# Patient Record
Sex: MALE | Race: WHITE | Employment: STUDENT | URBAN - METROPOLITAN AREA
[De-identification: names, ages, dates, MRNs, and addresses within clinical notes are randomized per-mention and may not be internally consistent; named-entity substitution may affect disease eponyms.]

---
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
## 2022-03-17 ENCOUNTER — TRANSCRIBE ORDER (OUTPATIENT)
Dept: SCHEDULING | Age: 22
End: 2022-03-17

## 2022-03-17 DIAGNOSIS — M54.16 LUMBAR RADICULOPATHY: ICD-10-CM

## 2022-03-17 DIAGNOSIS — M54.50 LUMBAR PAIN: Primary | ICD-10-CM

## 2022-03-17 DIAGNOSIS — M51.26 DISPLACEMENT OF LUMBAR INTERVERTEBRAL DISC WITHOUT MYELOPATHY: ICD-10-CM

## 2022-03-19 PROBLEM — M51.26 HNP (HERNIATED NUCLEUS PULPOSUS), LUMBAR: Status: ACTIVE | Noted: 2017-12-05

## 2022-05-04 ENCOUNTER — TRANSCRIBE ORDER (OUTPATIENT)
Dept: SCHEDULING | Age: 22
End: 2022-05-04

## 2022-05-04 DIAGNOSIS — M47.817 LUMBOSACRAL SPONDYLOSIS WITHOUT MYELOPATHY: ICD-10-CM

## 2022-05-04 DIAGNOSIS — M54.16 LUMBAR RADICULOPATHY: ICD-10-CM

## 2022-05-04 DIAGNOSIS — M54.50 LUMBAR PAIN: Primary | ICD-10-CM

## 2022-05-24 ENCOUNTER — HOSPITAL ENCOUNTER (OUTPATIENT)
Dept: MRI IMAGING | Age: 22
Discharge: HOME | End: 2022-05-24
Attending: ORTHOPAEDIC SURGERY
Payer: COMMERCIAL

## 2022-05-24 DIAGNOSIS — M47.817 LUMBOSACRAL SPONDYLOSIS WITHOUT MYELOPATHY: ICD-10-CM

## 2022-05-24 DIAGNOSIS — M54.16 LUMBAR RADICULOPATHY: ICD-10-CM

## 2022-05-24 DIAGNOSIS — M54.50 LUMBAR PAIN: ICD-10-CM

## 2022-05-24 PROCEDURE — 72148 MRI LUMBAR SPINE W/O DYE: CPT

## 2022-07-21 ENCOUNTER — HOSPITAL ENCOUNTER (OUTPATIENT)
Dept: GENERAL RADIOLOGY | Age: 22
Discharge: HOME | End: 2022-07-21
Attending: ORTHOPAEDIC SURGERY
Payer: COMMERCIAL

## 2022-07-21 ENCOUNTER — HOSPITAL ENCOUNTER (OUTPATIENT)
Dept: PREADMISSION TESTING | Age: 22
Discharge: HOME | End: 2022-07-21
Payer: COMMERCIAL

## 2022-07-21 VITALS
SYSTOLIC BLOOD PRESSURE: 121 MMHG | OXYGEN SATURATION: 97 % | HEART RATE: 76 BPM | BODY MASS INDEX: 33.38 KG/M2 | RESPIRATION RATE: 20 BRPM | TEMPERATURE: 98 F | HEIGHT: 66 IN | DIASTOLIC BLOOD PRESSURE: 77 MMHG | WEIGHT: 207.67 LBS

## 2022-07-21 LAB
25(OH)D3 SERPL-MCNC: 15.6 NG/ML (ref 30–100)
ABO + RH BLD: NORMAL
ALBUMIN SERPL-MCNC: 3.9 G/DL (ref 3.5–5)
ALBUMIN/GLOB SERPL: 1.1 {RATIO} (ref 1.1–2.2)
ALP SERPL-CCNC: 70 U/L (ref 45–117)
ALT SERPL-CCNC: 20 U/L (ref 12–78)
AMPHET UR QL SCN: NEGATIVE
ANION GAP SERPL CALC-SCNC: 7 MMOL/L (ref 5–15)
APPEARANCE UR: CLEAR
AST SERPL-CCNC: 17 U/L (ref 15–37)
BACTERIA URNS QL MICRO: NEGATIVE /HPF
BARBITURATES UR QL SCN: NEGATIVE
BENZODIAZ UR QL: NEGATIVE
BILIRUB SERPL-MCNC: 0.6 MG/DL (ref 0.2–1)
BILIRUB UR QL: NEGATIVE
BLOOD GROUP ANTIBODIES SERPL: NORMAL
BUN SERPL-MCNC: 12 MG/DL (ref 6–20)
BUN/CREAT SERPL: 15 (ref 12–20)
CALCIUM SERPL-MCNC: 9 MG/DL (ref 8.5–10.1)
CANNABINOIDS UR QL SCN: POSITIVE
CHLORIDE SERPL-SCNC: 108 MMOL/L (ref 97–108)
CO2 SERPL-SCNC: 24 MMOL/L (ref 21–32)
COCAINE UR QL SCN: NEGATIVE
COLOR UR: ABNORMAL
CREAT SERPL-MCNC: 0.81 MG/DL (ref 0.7–1.3)
DRUG SCRN COMMENT,DRGCM: ABNORMAL
EPITH CASTS URNS QL MICRO: ABNORMAL /LPF
ERYTHROCYTE [DISTWIDTH] IN BLOOD BY AUTOMATED COUNT: 11.5 % (ref 11.5–14.5)
EST. AVERAGE GLUCOSE BLD GHB EST-MCNC: 94 MG/DL
GLOBULIN SER CALC-MCNC: 3.4 G/DL (ref 2–4)
GLUCOSE SERPL-MCNC: 93 MG/DL (ref 65–100)
GLUCOSE UR STRIP.AUTO-MCNC: NEGATIVE MG/DL
HBA1C MFR BLD: 4.9 % (ref 4–5.6)
HCT VFR BLD AUTO: 39.8 % (ref 36.6–50.3)
HGB BLD-MCNC: 14.3 G/DL (ref 12.1–17)
HGB UR QL STRIP: NEGATIVE
HYALINE CASTS URNS QL MICRO: ABNORMAL /LPF (ref 0–2)
INR PPP: 1 (ref 0.9–1.1)
KETONES UR QL STRIP.AUTO: ABNORMAL MG/DL
LEUKOCYTE ESTERASE UR QL STRIP.AUTO: NEGATIVE
MCH RBC QN AUTO: 30.5 PG (ref 26–34)
MCHC RBC AUTO-ENTMCNC: 35.9 G/DL (ref 30–36.5)
MCV RBC AUTO: 84.9 FL (ref 80–99)
METHADONE UR QL: NEGATIVE
NITRITE UR QL STRIP.AUTO: NEGATIVE
NRBC # BLD: 0 K/UL (ref 0–0.01)
NRBC BLD-RTO: 0 PER 100 WBC
OPIATES UR QL: NEGATIVE
PCP UR QL: NEGATIVE
PH UR STRIP: 6 [PH] (ref 5–8)
PLATELET # BLD AUTO: 289 K/UL (ref 150–400)
PMV BLD AUTO: 10 FL (ref 8.9–12.9)
POTASSIUM SERPL-SCNC: 3.8 MMOL/L (ref 3.5–5.1)
PREALB SERPL-MCNC: 22.7 MG/DL (ref 20–40)
PROT SERPL-MCNC: 7.3 G/DL (ref 6.4–8.2)
PROT UR STRIP-MCNC: NEGATIVE MG/DL
PROTHROMBIN TIME: 10.6 SEC (ref 9–11.1)
RBC # BLD AUTO: 4.69 M/UL (ref 4.1–5.7)
RBC #/AREA URNS HPF: ABNORMAL /HPF (ref 0–5)
SODIUM SERPL-SCNC: 139 MMOL/L (ref 136–145)
SP GR UR REFRACTOMETRY: 1.03
SPECIMEN EXP DATE BLD: NORMAL
UA: UC IF INDICATED,UAUC: ABNORMAL
UROBILINOGEN UR QL STRIP.AUTO: 1 EU/DL (ref 0.2–1)
WBC # BLD AUTO: 8.8 K/UL (ref 4.1–11.1)
WBC URNS QL MICRO: ABNORMAL /HPF (ref 0–4)

## 2022-07-21 PROCEDURE — 80307 DRUG TEST PRSMV CHEM ANLYZR: CPT

## 2022-07-21 PROCEDURE — 86900 BLOOD TYPING SEROLOGIC ABO: CPT

## 2022-07-21 PROCEDURE — 93005 ELECTROCARDIOGRAM TRACING: CPT

## 2022-07-21 PROCEDURE — 84134 ASSAY OF PREALBUMIN: CPT

## 2022-07-21 PROCEDURE — 80053 COMPREHEN METABOLIC PANEL: CPT

## 2022-07-21 PROCEDURE — 97116 GAIT TRAINING THERAPY: CPT

## 2022-07-21 PROCEDURE — 85610 PROTHROMBIN TIME: CPT

## 2022-07-21 PROCEDURE — 82306 VITAMIN D 25 HYDROXY: CPT

## 2022-07-21 PROCEDURE — 36415 COLL VENOUS BLD VENIPUNCTURE: CPT

## 2022-07-21 PROCEDURE — 81001 URINALYSIS AUTO W/SCOPE: CPT

## 2022-07-21 PROCEDURE — 85027 COMPLETE CBC AUTOMATED: CPT

## 2022-07-21 PROCEDURE — 97161 PT EVAL LOW COMPLEX 20 MIN: CPT

## 2022-07-21 PROCEDURE — 71046 X-RAY EXAM CHEST 2 VIEWS: CPT

## 2022-07-21 PROCEDURE — 83036 HEMOGLOBIN GLYCOSYLATED A1C: CPT

## 2022-07-21 RX ORDER — SODIUM CHLORIDE, SODIUM LACTATE, POTASSIUM CHLORIDE, CALCIUM CHLORIDE 600; 310; 30; 20 MG/100ML; MG/100ML; MG/100ML; MG/100ML
25 INJECTION, SOLUTION INTRAVENOUS CONTINUOUS
Status: CANCELLED | OUTPATIENT
Start: 2022-07-26

## 2022-07-21 RX ORDER — ACETAMINOPHEN 500 MG
1000 TABLET ORAL ONCE
Status: CANCELLED | OUTPATIENT
Start: 2022-07-26 | End: 2022-07-26

## 2022-07-21 RX ORDER — PREGABALIN 75 MG/1
75 CAPSULE ORAL ONCE
Status: CANCELLED | OUTPATIENT
Start: 2022-07-26 | End: 2022-07-26

## 2022-07-21 NOTE — PROGRESS NOTES
[facility]  Physical Therapy Pre-surgery evaluation  [address] [facility], [address] [facility]    PHYSICAL THERAPY PRE SPINE SURGERY EVALUATION  Patient:Ameya Priest (17 y.o. male)  Date: 7/21/2022    pat  Procedure(s) (LRB):  LEFT L5-S1 DECOMPRESSION (Left)     Precautions:          ASSESSMENT :  Based on the objective data described below, the patient presents with impaired sensation with constant/variable intensity of pain with radiation into LLE due to end stage degenerative joint disease in the spinal level: lumbar spine. Discussed anticipated disposition to home with possible discharge within a 1 to 2 day time frame post-surgery. Patient and  in agreement. Anticipate patient will need acute PT and OT orders based on expected deficits post surgery. GOALS: (Goals have been discussed and agreed upon with patient.)  DISCHARGE GOALS: Time Frame: 1 DAY  Patient will demonstrate increased strength, range of motion, and pain control via a home exercise program in order to minimize functional deficits in preparation for their upcoming surgery. This will be achieved by using education, demonstration and through the use of an informational handout including a home exercise program.  REHABILITATION POTENTIAL FOR STATED GOALS: Excellent     RECOMMENDATIONS AND PLANNED INTERVENTIONS: (Benefits and precautions of physical therapy have been discussed with the patient.)  Home Exercise Program  TREATMENT PLAN EFFECTIVE DATES: 7/21/2022 to 7/21/2022   FREQUENCY/DURATION: Patient to continue to perform home exercise program at least twice daily until surgery. SUBJECTIVE:   Patient stated  my back pain is worse with standing and walking and decreases with sitting or lying in bed on my side.      OBJECTIVE DATA SUMMARY:   HISTORY:      Past Medical History:   Diagnosis Date    Chronic pain     Lumbar    Nicotine vapor product user      Past Surgical History:   Procedure Laterality Date HX HEENT      Oral Surgery    HX ORTHOPAEDIC  12/05/2017    right L5-S1 discectomy       Prior Level of Function/Home Situation: independent with ADL and mobility skills, but pain limits walking and standing significantly. Personal factors and/or comorbidities impacting plan of care: none      Home Situation  Home Environment: Private residence  # Steps to Enter: 0  Wheelchair Ramp: No  One/Two Story Residence: Other (Comment) (3 story home lives in basement/elevator to upstairs shower)  Lift Chair Available: No  Living Alone: No  Support Systems: Other Family Member(s)  Patient Expects to be Discharged to[de-identified] Home  Current DME Used/Available at Home: None  Tub or Shower Type: Shower           EXAMINATION/PRESENTATION/DECISION MAKING:     ADLs (Current Functional Status): Bathing/Showering:   [x] Independent  [] Requires Assistance from Someone  [] Sponge Bath Only   Ambulation:  [x] Independent  [] Walk Indoors Only  [x] Walk Outdoors - limited distances  [] Use Assistive Device  [] Use Wheelchair Only     Dressing:  [x] [address] from Someone for:  [] Sock/Shoes  [] Pants  [] Everything   Household Activities:  [] Routine house and yard work  [] Light Housework Only  [x] None       Critical Behavior:  Neurologic State: Alert  Orientation Level: Oriented X4  Cognition: Appropriate decision making, Appropriate for age attention/concentration, Appropriate safety awareness, Follows commands  Safety/Judgement: Awareness of environment, Good awareness of safety precautions    Strength:     Strength:  Within functional limits                       Tone & Sensation:   Tone: Normal              Sensation: Impaired (R 3 lateral toes variable numbness; R 5th toe constantly numb)                  Range Of Motion:     AROM: Within functional limits           PROM: Within functional limits                Coordination:   Coordination: Within functional limits    Functional Mobility:  Transfers:  Sit to Stand: Independent  Stand to Sit: Independent        Bed to Chair: Independent              Balance:   Sitting: Intact  Standing: Intact  Ambulation/Gait Training:              Gait Description (WDL): Within defined limits (normal gait 150 feet)                                          Functional Measure:  10 Meter walk test:  (Specify if any supplemental oxygen is used, the type, pre, during and post sats.)    Self-Selected Or Fast-Velocity: Self Selected Velocity  Trial 1 (Time to Walk 10 Meters): 10.18 Seconds  Trial 2 (Time to Walk 10 Meters): 9.69 Seconds  Trial 3 (Time to Walk 10 Meters): 9.89 Seconds  Average : 9.9 Seconds  Score (Meters/Second): 1 Meters/Second             Walking Speed (m/s)  Modifier Scale Age 52-63 Age 61-76 Age 66-77 Age 80-80    Male Female Male Female Male Female Male Female   CH   0% Impaired ? 1.39 ? 1.40 ? 1.36 ? 1.30 ? 1.33 ? 1.27 ? 1.21 ? 1.15   CI   1-19% Impaired 1.11-1.38 1.12-1.39 1.09-1.35 1.04-1.29 1.06-1.32 1.01-1.26 0.96-1.20 0.92-1.14   CJ   20-39% Impaired 0.83-1.10 0.84-1.11 0.82-1.08 0.78-1.03 0.80-1.05 0.76-1.00 0.72-0.95 0.69-0.91   CK   40-59% Impaired 0.56-0.82 0.57-0.83 0.54-0.81 0.52-0.77 0.53-0.79 0.51-0.75 0.48-0.71 0.46-0.68   CL   60-79% Impaired 0.28-0.55 0.28-0.56 0.27-0.53 0.26-0.51 0.27-0.52 0.25-0.50 0.24-0.49 0.23-0.45   CM   80-99% Impaired 0.01-0.28 < 0.01-0.28 < 0.01-0.27 < 0.01-0.26 0.01-0.27 0.01-0.24 0.01-0.23 0.01-0.22   CN   100% Impaired Cannot Perform   Minimal Detectable Change (MDC-90) = 0.1 m/s  Vivien ABURTO \"Comfortable and maximum walking speed of adults aged 20-79 years: reference values and determinants. \" Age and Agin Volume 26(1):15-9. Paulette Lucio. \"Age- and gender-related test performance in community-dwelling elderly people: Six-Minute Walk Test, Zafar Balance Scale, Timed Up & Go Test, and gait speeds. \" Physical Therapy: 2002 Volume 82(2):128-37.   Isabell VILLANUEVA, Josias GIANG, Aníbal Millard D. \"Assessing stability and change of four performance measures: a longitudinal study evaluating outcome following total hip and knee arthroplasty. \" [facility] Musculoskeletal Disorders: 2005 Volume 6(3). Endy Coker, PhD; Norma Jerez, PhD. Gwen Bradley Paper: \"Walking Speed: the Sixth Vital Sign\" Journal of Geriatric Physical Therapy: 2009 - Volume 32 - Issue 2 - p 2-5 . Pain:  Pain Scale 1: Numeric (0 - 10)  Pain Intensity 1: 0                Radicular pain:   Lower back to LLE down posterior thigh to just behind knee. Activity Tolerance:   Fair due to lower back pain. Patient []   does  [x]   does not demonstrate signs/symptoms of shortness of breath/dyspnea on exertion/respiratory distress. COMMUNICATION/EDUCATION:   The patient was educated on:  [x]         Early post operative mobility is imperative to achieve a patient's desired outcomes and to restore biological function. [x]         Post operative spinal precautions may/may not be applicable. These precautions are based on the patient's physician and the procedure(s) performed. [x]         Spinal precautions including:    No bending forward, sideways, or backwards    No twisting     No lifting more than 5-10 pounds    No sitting longer than 30-60 minutes at a time    Mauricio brace when out of bed and mobilizing    The patients plan of care was discussed as follows:   [x]         The patient verbalized understanding of his/her plan in preparation for their upcoming surgery  []         The patient's  was present for this session  [x]        The patient reports that he/she does not have a  identified at this time  []         The  verbalized understanding of the education regarding the patient's upcoming surgery  [x]         Patient/family agree to work toward stated goals and plan of care. []         Patient understands intent and goals of therapy, but is neutral about his/her participation.   []         Patient is unable to participate in goal setting and plan of care.       Thank you for this referral.  Silvestre Maldonado, PT    Time Calculation: 13 mins

## 2022-07-21 NOTE — PERIOP NOTES
[facility]  Joint/Spine Preoperative Instructions        Surgery Date 07/26/22     Time of Arrival --to be called with time  Contact # [phone]  1. On the day of your surgery, please report to the Surgical Services Registration Desk and sign in at your designated time. The Surgery Center is located to the right of the Emergency Room. 2. You must have someone with you to drive you home. You should not drive a car for 24 hours following surgery. Please make arrangements for a friend or family member to stay with you for the first 24 hours after your surgery. 3. No food after midnight     0725/22  medications morning of surgery should be taken with a sip of water. Please follow pre-surgery drink instructions that were given at your Pre Admission Testing appointment. 4. We recommend you do not drink any alcoholic beverages for 24 hours before and after your surgery. 5. Contact your surgeons office for instructions on the following medications: non-steroidal anti-inflammatory drugs (i.e. Advil, Aleve), vitamins, and supplements. (Some surgeons will want you to stop these medications prior to surgery and others may allow you to take them)  **If you are currently taking Plavix, Coumadin, Aspirin and/or other blood-thinning agents, contact your surgeon for instructions. ** Your surgeon will partner with the physician prescribing these medications to determine if it is safe to stop or if you need to continue taking. Please do not stop taking these medications without instructions from your surgeon    6. Wear comfortable clothes. Wear glasses instead of contacts. Do not bring any money or jewelry. Please bring picture ID, insurance card, and any prearranged co-payment or hospital payment. Do not wear make-up, particularly mascara the morning of your surgery. Do not wear nail polish, particularly if you are having foot /hand surgery.   Wear your hair loose or down, no ponytails, buns, parish pins or clips. All body piercings must be removed. Please shower with antibacterial soap for three consecutive days before and on the morning of surgery, but do not apply any lotions, powders or deodorants after the shower on the day of surgery. Please use a fresh towels after each shower. Please sleep in clean clothes and change bed linens the night before surgery. Please do not shave for 48 hours prior to surgery. Shaving of the face is acceptable. 7. You should understand that if you do not follow these instructions your surgery may be cancelled. If your physical condition changes (I.e. fever, cold or flu) please contact your surgeon as soon as possible. 8. It is important that you be on time. If a situation occurs where you may be late, please call [phone] (OR Holding Area). 9. If you have any questions and or problems, please call [phone] (Pre-admission Testing). 10. Your surgery time may be subject to change. You will receive a phone call the evening prior if your time changes. 11.  If having outpatient surgery, you must have someone to drive you here, stay with you during the duration of your stay, and to drive you home at time of discharge. 12. The following link is for the educational video for patients and/or families. [email or web address]-sharma.[email or web address]-qkhohhj-mwpxavf/Mobile/[facility]/[email or web address]-materials    1  Special Instructions:       TAKE ALL MEDICATIONS THE DAY OF SURGERY EXCEPT:no  exception      I understand a pre-operative phone call will be made to verify my surgery time. In the event that I am not available, I give permission for a message to be left on my answering service and/or with another person?   yes         ___________________      __________   _________    (Signature of Patient)             (Witness)                (Date and Time)

## 2022-07-21 NOTE — PERIOP NOTES
[facility]  \"We've Got Your Back! Caring For Yourself Before and After Spine Surgery\" handbook was provided & reviewed with the patient during the pre-admission testing (PAT) appointment. An opportunity for questions was provided, patient verbalized understanding.

## 2022-07-21 NOTE — PERIOP NOTES

## 2022-07-21 NOTE — PERIOP NOTES
Hibiclens/Chlorhexidine    Preventing Infections Before and After - Your Surgery    IMPORTANT INSTRUCTIONS    Please read and follow these instructions carefully. If you are unable to comply with the below instructions your procedure will be cancelled. Every Night for Three (3) nights before your surgery:  Shower with an antibacterial soap, such as Dial, or the soap provided at your preassessment appointment. A shower is better than a bath for cleaning your skin. If needed, ask someone to help you reach all areas of your body. Dont forget to clean your belly button with every shower. The night before your surgery: If you lose your Hibiclens/chlorhexidine please contact surgery center or you can purchase it at a local pharmacy  On the night before your surgery, shower with an antibacterial soap, such as Dial, or the soap provided at your preassessment appointment. With one packet of Hibiclens/Chlorhexidine in hand, turn water off. Apply Hibiclens antiseptic skin cleanser with a clean, freshly washed washcloth. Gently apply to your body from chin to toes (except the genital area) and especially the area(s) where your incision(s) will be. Leave Hibiclens/Chlorhexidine on your skin for at least 20 seconds. CAUTION: If needed, Hibiclens/chlorhexidine may be used to clean the folds of skin of the legs (such as in the area of the groin) and on your buttocks and hips. However, do not use Hibiclens/Chlorhexidine above the neck or in the genital area (your bottom) or put inside any area of your body. Turn the water back on and rinse. Dry gently with a clean, freshly washed towel. After your shower, do not use any powder, deodorant, perfumes or lotion. Use clean, freshly washed towels and washcloths every time you shower. Wear clean, freshly washed pajamas to bed the night before surgery. Sleep on clean, freshly washed sheets. Do not allow pets to sleep in your bed with you.         The Morning of your surgery:  Shower again thoroughly with an antibacterial soap, such as Dial or the soap provided at your preassessment appointment. If needed, ask someone for help to reach all areas of your body. Dont forget to clean your belly button! Rinse. Dry gently with a clean, freshly washed towel. After your shower, do not use any powder, deodorant, perfumes or lotion prior to surgery. Put on clean, freshly washed clothing. Tips to help prevent infections after your surgery:  Protect your surgical wound from germs:  Hand washing is the most important thing you and your caregivers can do to prevent infections. Keep your bandage clean and dry! Do not touch your surgical wound. Use clean, freshly washed towels and washcloths every time you shower; do not share bath linens with others. Until your surgical wound is healed, wear clothing and sleep on bed linens each day that are clean and freshly washed. Do not allow pets to sleep in your bed with you or touch your surgical wound. Do not smoke - smoking delays wound healing. This may be a good time to stop smoking. If you have diabetes, it is important for you to manage your blood sugar levels properly before your surgery as well as after your surgery. Poorly managed blood sugar levels slow down wound healing and prevent you from healing completely. If you lose your Hibiclens/chlorhexidine, please call the [facility], or it is available for purchase at your pharmacy.                ___________________      ___________________      ________________  (Signature of Patient)          (Witness)                   (Date and Time)

## 2022-07-21 NOTE — PERIOP NOTES

## 2022-07-22 LAB
ATRIAL RATE: 60 BPM
BACTERIA SPEC CULT: NORMAL
BACTERIA SPEC CULT: NORMAL
CALCULATED P AXIS, ECG09: -13 DEGREES
CALCULATED R AXIS, ECG10: 17 DEGREES
CALCULATED T AXIS, ECG11: 31 DEGREES
DIAGNOSIS, 93000: NORMAL
P-R INTERVAL, ECG05: 154 MS
Q-T INTERVAL, ECG07: 360 MS
QRS DURATION, ECG06: 96 MS
QTC CALCULATION (BEZET), ECG08: 360 MS
SERVICE CMNT-IMP: NORMAL
VENTRICULAR RATE, ECG03: 60 BPM

## 2022-07-25 NOTE — PERIOP NOTES
Message left for patient to start Vitamin 2000 units daily on Friday, 7/22/22. I called this am and verified patient received the message. Patient states he started the Vitamin D over the weekend.

## 2022-07-26 ENCOUNTER — APPOINTMENT (OUTPATIENT)
Dept: GENERAL RADIOLOGY | Age: 22
End: 2022-07-26
Attending: ORTHOPAEDIC SURGERY
Payer: COMMERCIAL

## 2022-07-26 ENCOUNTER — ANESTHESIA (OUTPATIENT)
Dept: SURGERY | Age: 22
End: 2022-07-26
Payer: COMMERCIAL

## 2022-07-26 ENCOUNTER — ANESTHESIA EVENT (OUTPATIENT)
Dept: SURGERY | Age: 22
End: 2022-07-26
Payer: COMMERCIAL

## 2022-07-26 ENCOUNTER — HOSPITAL ENCOUNTER (OUTPATIENT)
Age: 22
Discharge: HOME | End: 2022-07-26
Attending: ORTHOPAEDIC SURGERY | Admitting: ORTHOPAEDIC SURGERY
Payer: COMMERCIAL

## 2022-07-26 VITALS
DIASTOLIC BLOOD PRESSURE: 80 MMHG | WEIGHT: 208.11 LBS | RESPIRATION RATE: 18 BRPM | BODY MASS INDEX: 33.45 KG/M2 | HEART RATE: 88 BPM | TEMPERATURE: 98 F | SYSTOLIC BLOOD PRESSURE: 133 MMHG | OXYGEN SATURATION: 97 % | HEIGHT: 66 IN

## 2022-07-26 DIAGNOSIS — Z98.890 STATUS POST LUMBAR SPINE OPERATIVE PROCEDURE FOR DECOMPRESSION OF SPINAL CORD: Primary | ICD-10-CM

## 2022-07-26 DIAGNOSIS — M51.26 HNP (HERNIATED NUCLEUS PULPOSUS), LUMBAR: ICD-10-CM

## 2022-07-26 PROCEDURE — 77030018723 HC ELCTRD BLD COVD -A: Performed by: ORTHOPAEDIC SURGERY

## 2022-07-26 PROCEDURE — 74011250636 HC RX REV CODE- 250/636: Performed by: ANESTHESIOLOGY

## 2022-07-26 PROCEDURE — 77030038692 HC WND DEB SYS IRMX -B: Performed by: ORTHOPAEDIC SURGERY

## 2022-07-26 PROCEDURE — 76000 FLUOROSCOPY <1 HR PHYS/QHP: CPT

## 2022-07-26 PROCEDURE — 97116 GAIT TRAINING THERAPY: CPT | Performed by: PHYSICAL THERAPIST

## 2022-07-26 PROCEDURE — 77030040922 HC BLNKT HYPOTHRM STRY -A

## 2022-07-26 PROCEDURE — 97530 THERAPEUTIC ACTIVITIES: CPT | Performed by: PHYSICAL THERAPIST

## 2022-07-26 PROCEDURE — 77030019908 HC STETH ESOPH SIMS -A: Performed by: NURSE ANESTHETIST, CERTIFIED REGISTERED

## 2022-07-26 PROCEDURE — 74011250636 HC RX REV CODE- 250/636: Performed by: ORTHOPAEDIC SURGERY

## 2022-07-26 PROCEDURE — 77030004402 HC BUR NEUR STRY -C: Performed by: ORTHOPAEDIC SURGERY

## 2022-07-26 PROCEDURE — 77030033138 HC SUT PGA STRATFX J&J -B: Performed by: ORTHOPAEDIC SURGERY

## 2022-07-26 PROCEDURE — 77030003666 HC NDL SPINAL BD -A: Performed by: ORTHOPAEDIC SURGERY

## 2022-07-26 PROCEDURE — G0378 HOSPITAL OBSERVATION PER HR: HCPCS

## 2022-07-26 PROCEDURE — 74011250636 HC RX REV CODE- 250/636: Performed by: NURSE ANESTHETIST, CERTIFIED REGISTERED

## 2022-07-26 PROCEDURE — 76210000002 HC OR PH I REC 3 TO 3.5 HR: Performed by: ORTHOPAEDIC SURGERY

## 2022-07-26 PROCEDURE — L0627 LO SAG RI AN/POS PNL PRE CST: HCPCS

## 2022-07-26 PROCEDURE — 77030003029 HC SUT VCRL J&J -B: Performed by: ORTHOPAEDIC SURGERY

## 2022-07-26 PROCEDURE — 74011000250 HC RX REV CODE- 250: Performed by: NURSE ANESTHETIST, CERTIFIED REGISTERED

## 2022-07-26 PROCEDURE — 97165 OT EVAL LOW COMPLEX 30 MIN: CPT | Performed by: OCCUPATIONAL THERAPIST

## 2022-07-26 PROCEDURE — 2709999900 HC NON-CHARGEABLE SUPPLY: Performed by: ORTHOPAEDIC SURGERY

## 2022-07-26 PROCEDURE — 74011000250 HC RX REV CODE- 250: Performed by: ORTHOPAEDIC SURGERY

## 2022-07-26 PROCEDURE — 2709999900 HC NON-CHARGEABLE SUPPLY

## 2022-07-26 PROCEDURE — 72100 X-RAY EXAM L-S SPINE 2/3 VWS: CPT

## 2022-07-26 PROCEDURE — 77030003028 HC SUT VCRL J&J -A: Performed by: ORTHOPAEDIC SURGERY

## 2022-07-26 PROCEDURE — 97535 SELF CARE MNGMENT TRAINING: CPT | Performed by: OCCUPATIONAL THERAPIST

## 2022-07-26 PROCEDURE — 77030008684 HC TU ET CUF COVD -B: Performed by: NURSE ANESTHETIST, CERTIFIED REGISTERED

## 2022-07-26 PROCEDURE — 74011250637 HC RX REV CODE- 250/637: Performed by: ORTHOPAEDIC SURGERY

## 2022-07-26 PROCEDURE — 77030014647 HC SEAL FBRN TISSL BAXT -D: Performed by: ORTHOPAEDIC SURGERY

## 2022-07-26 PROCEDURE — 76060000034 HC ANESTHESIA 1.5 TO 2 HR: Performed by: ORTHOPAEDIC SURGERY

## 2022-07-26 PROCEDURE — 97530 THERAPEUTIC ACTIVITIES: CPT | Performed by: OCCUPATIONAL THERAPIST

## 2022-07-26 PROCEDURE — 77030026438 HC STYL ET INTUB CARD -A: Performed by: NURSE ANESTHETIST, CERTIFIED REGISTERED

## 2022-07-26 PROCEDURE — 97161 PT EVAL LOW COMPLEX 20 MIN: CPT | Performed by: PHYSICAL THERAPIST

## 2022-07-26 PROCEDURE — 77030035236 HC SUT PDS STRATFX BARB J&J -B: Performed by: ORTHOPAEDIC SURGERY

## 2022-07-26 PROCEDURE — 76010000162 HC OR TIME 1.5 TO 2 HR INTENSV-TIER 1: Performed by: ORTHOPAEDIC SURGERY

## 2022-07-26 RX ORDER — CYCLOBENZAPRINE HCL 10 MG
10 TABLET ORAL
Status: DISCONTINUED | OUTPATIENT
Start: 2022-07-26 | End: 2022-07-26 | Stop reason: HOSPADM

## 2022-07-26 RX ORDER — PROPOFOL 10 MG/ML
INJECTION, EMULSION INTRAVENOUS AS NEEDED
Status: DISCONTINUED | OUTPATIENT
Start: 2022-07-26 | End: 2022-07-26 | Stop reason: HOSPADM

## 2022-07-26 RX ORDER — NEOSTIGMINE METHYLSULFATE 1 MG/ML
INJECTION, SOLUTION INTRAVENOUS AS NEEDED
Status: DISCONTINUED | OUTPATIENT
Start: 2022-07-26 | End: 2022-07-26 | Stop reason: HOSPADM

## 2022-07-26 RX ORDER — OXYCODONE HYDROCHLORIDE 5 MG/1
5 TABLET ORAL
Qty: 28 TABLET | Refills: 0 | Status: SHIPPED | OUTPATIENT
Start: 2022-07-26 | End: 2022-08-02

## 2022-07-26 RX ORDER — ACETAMINOPHEN 500 MG
1000 TABLET ORAL EVERY 6 HOURS
Qty: 56 TABLET | Refills: 0 | Status: SHIPPED | OUTPATIENT
Start: 2022-07-26 | End: 2022-08-02

## 2022-07-26 RX ORDER — POLYETHYLENE GLYCOL 3350 17 G/17G
17 POWDER, FOR SOLUTION ORAL DAILY
Status: DISCONTINUED | OUTPATIENT
Start: 2022-07-27 | End: 2022-07-26 | Stop reason: HOSPADM

## 2022-07-26 RX ORDER — SODIUM CHLORIDE, SODIUM LACTATE, POTASSIUM CHLORIDE, CALCIUM CHLORIDE 600; 310; 30; 20 MG/100ML; MG/100ML; MG/100ML; MG/100ML
75 INJECTION, SOLUTION INTRAVENOUS CONTINUOUS
Status: DISCONTINUED | OUTPATIENT
Start: 2022-07-26 | End: 2022-07-26 | Stop reason: HOSPADM

## 2022-07-26 RX ORDER — SODIUM CHLORIDE 9 MG/ML
50 INJECTION, SOLUTION INTRAVENOUS CONTINUOUS
Status: DISCONTINUED | OUTPATIENT
Start: 2022-07-26 | End: 2022-07-26 | Stop reason: HOSPADM

## 2022-07-26 RX ORDER — FENTANYL CITRATE 50 UG/ML
INJECTION, SOLUTION INTRAMUSCULAR; INTRAVENOUS AS NEEDED
Status: DISCONTINUED | OUTPATIENT
Start: 2022-07-26 | End: 2022-07-26 | Stop reason: HOSPADM

## 2022-07-26 RX ORDER — OXYCODONE HYDROCHLORIDE 5 MG/1
10-15 TABLET ORAL
Status: DISCONTINUED | OUTPATIENT
Start: 2022-07-26 | End: 2022-07-26 | Stop reason: HOSPADM

## 2022-07-26 RX ORDER — ONDANSETRON 2 MG/ML
4 INJECTION INTRAMUSCULAR; INTRAVENOUS AS NEEDED
Status: DISCONTINUED | OUTPATIENT
Start: 2022-07-26 | End: 2022-07-26 | Stop reason: HOSPADM

## 2022-07-26 RX ORDER — SODIUM CHLORIDE 0.9 % (FLUSH) 0.9 %
5-40 SYRINGE (ML) INJECTION AS NEEDED
Status: DISCONTINUED | OUTPATIENT
Start: 2022-07-26 | End: 2022-07-26 | Stop reason: HOSPADM

## 2022-07-26 RX ORDER — FENTANYL CITRATE 50 UG/ML
25 INJECTION, SOLUTION INTRAMUSCULAR; INTRAVENOUS
Status: DISCONTINUED | OUTPATIENT
Start: 2022-07-26 | End: 2022-07-26 | Stop reason: HOSPADM

## 2022-07-26 RX ORDER — AMOXICILLIN 250 MG
1 CAPSULE ORAL 2 TIMES DAILY
Qty: 14 TABLET | Refills: 0 | Status: SHIPPED | OUTPATIENT
Start: 2022-07-26 | End: 2022-08-02

## 2022-07-26 RX ORDER — SUCCINYLCHOLINE CHLORIDE 20 MG/ML
INJECTION INTRAMUSCULAR; INTRAVENOUS AS NEEDED
Status: DISCONTINUED | OUTPATIENT
Start: 2022-07-26 | End: 2022-07-26 | Stop reason: HOSPADM

## 2022-07-26 RX ORDER — AMOXICILLIN 250 MG
1 CAPSULE ORAL 2 TIMES DAILY
Status: DISCONTINUED | OUTPATIENT
Start: 2022-07-26 | End: 2022-07-26 | Stop reason: HOSPADM

## 2022-07-26 RX ORDER — FAMOTIDINE 20 MG/1
20 TABLET, FILM COATED ORAL 2 TIMES DAILY
Status: DISCONTINUED | OUTPATIENT
Start: 2022-07-26 | End: 2022-07-26 | Stop reason: HOSPADM

## 2022-07-26 RX ORDER — ACETAMINOPHEN 500 MG
1000 TABLET ORAL ONCE
Status: COMPLETED | OUTPATIENT
Start: 2022-07-26 | End: 2022-07-26

## 2022-07-26 RX ORDER — KETOROLAC TROMETHAMINE 30 MG/ML
15 INJECTION, SOLUTION INTRAMUSCULAR; INTRAVENOUS EVERY 6 HOURS
Status: DISCONTINUED | OUTPATIENT
Start: 2022-07-26 | End: 2022-07-26 | Stop reason: HOSPADM

## 2022-07-26 RX ORDER — GLYCOPYRROLATE 0.2 MG/ML
INJECTION INTRAMUSCULAR; INTRAVENOUS AS NEEDED
Status: DISCONTINUED | OUTPATIENT
Start: 2022-07-26 | End: 2022-07-26 | Stop reason: HOSPADM

## 2022-07-26 RX ORDER — DEXMEDETOMIDINE HYDROCHLORIDE 100 UG/ML
INJECTION, SOLUTION INTRAVENOUS AS NEEDED
Status: DISCONTINUED | OUTPATIENT
Start: 2022-07-26 | End: 2022-07-26 | Stop reason: HOSPADM

## 2022-07-26 RX ORDER — ACETAMINOPHEN 500 MG
1000 TABLET ORAL EVERY 6 HOURS
Status: DISCONTINUED | OUTPATIENT
Start: 2022-07-26 | End: 2022-07-26 | Stop reason: HOSPADM

## 2022-07-26 RX ORDER — FENTANYL CITRATE 50 UG/ML
50 INJECTION, SOLUTION INTRAMUSCULAR; INTRAVENOUS AS NEEDED
Status: DISCONTINUED | OUTPATIENT
Start: 2022-07-26 | End: 2022-07-26 | Stop reason: HOSPADM

## 2022-07-26 RX ORDER — MIDAZOLAM HYDROCHLORIDE 1 MG/ML
1 INJECTION, SOLUTION INTRAMUSCULAR; INTRAVENOUS AS NEEDED
Status: DISCONTINUED | OUTPATIENT
Start: 2022-07-26 | End: 2022-07-26 | Stop reason: HOSPADM

## 2022-07-26 RX ORDER — LIDOCAINE HYDROCHLORIDE 20 MG/ML
INJECTION, SOLUTION EPIDURAL; INFILTRATION; INTRACAUDAL; PERINEURAL AS NEEDED
Status: DISCONTINUED | OUTPATIENT
Start: 2022-07-26 | End: 2022-07-26 | Stop reason: HOSPADM

## 2022-07-26 RX ORDER — HYDROMORPHONE HYDROCHLORIDE 1 MG/ML
0.2 INJECTION, SOLUTION INTRAMUSCULAR; INTRAVENOUS; SUBCUTANEOUS
Status: DISCONTINUED | OUTPATIENT
Start: 2022-07-26 | End: 2022-07-26 | Stop reason: HOSPADM

## 2022-07-26 RX ORDER — ONDANSETRON 2 MG/ML
4 INJECTION INTRAMUSCULAR; INTRAVENOUS
Status: DISCONTINUED | OUTPATIENT
Start: 2022-07-26 | End: 2022-07-26 | Stop reason: HOSPADM

## 2022-07-26 RX ORDER — HYDROMORPHONE HYDROCHLORIDE 1 MG/ML
1 INJECTION, SOLUTION INTRAMUSCULAR; INTRAVENOUS; SUBCUTANEOUS
Status: DISCONTINUED | OUTPATIENT
Start: 2022-07-26 | End: 2022-07-26 | Stop reason: HOSPADM

## 2022-07-26 RX ORDER — DIPHENHYDRAMINE HYDROCHLORIDE 50 MG/ML
12.5 INJECTION, SOLUTION INTRAMUSCULAR; INTRAVENOUS AS NEEDED
Status: DISCONTINUED | OUTPATIENT
Start: 2022-07-26 | End: 2022-07-26 | Stop reason: HOSPADM

## 2022-07-26 RX ORDER — GABAPENTIN 100 MG/1
100 CAPSULE ORAL 3 TIMES DAILY
Qty: 90 CAPSULE | Refills: 0 | Status: SHIPPED | OUTPATIENT
Start: 2022-07-26 | End: 2022-08-25

## 2022-07-26 RX ORDER — FACIAL-BODY WIPES
10 EACH TOPICAL DAILY PRN
Status: DISCONTINUED | OUTPATIENT
Start: 2022-07-28 | End: 2022-07-26 | Stop reason: HOSPADM

## 2022-07-26 RX ORDER — LIDOCAINE HYDROCHLORIDE 10 MG/ML
0.1 INJECTION, SOLUTION EPIDURAL; INFILTRATION; INTRACAUDAL; PERINEURAL AS NEEDED
Status: DISCONTINUED | OUTPATIENT
Start: 2022-07-26 | End: 2022-07-26 | Stop reason: HOSPADM

## 2022-07-26 RX ORDER — ROCURONIUM BROMIDE 10 MG/ML
INJECTION, SOLUTION INTRAVENOUS AS NEEDED
Status: DISCONTINUED | OUTPATIENT
Start: 2022-07-26 | End: 2022-07-26 | Stop reason: HOSPADM

## 2022-07-26 RX ORDER — ONDANSETRON 2 MG/ML
INJECTION INTRAMUSCULAR; INTRAVENOUS AS NEEDED
Status: DISCONTINUED | OUTPATIENT
Start: 2022-07-26 | End: 2022-07-26 | Stop reason: HOSPADM

## 2022-07-26 RX ORDER — MIDAZOLAM HYDROCHLORIDE 1 MG/ML
0.5 INJECTION, SOLUTION INTRAMUSCULAR; INTRAVENOUS
Status: DISCONTINUED | OUTPATIENT
Start: 2022-07-26 | End: 2022-07-26 | Stop reason: HOSPADM

## 2022-07-26 RX ORDER — MORPHINE SULFATE 2 MG/ML
2 INJECTION, SOLUTION INTRAMUSCULAR; INTRAVENOUS
Status: DISCONTINUED | OUTPATIENT
Start: 2022-07-26 | End: 2022-07-26 | Stop reason: HOSPADM

## 2022-07-26 RX ORDER — SODIUM CHLORIDE 0.9 % (FLUSH) 0.9 %
5-40 SYRINGE (ML) INJECTION EVERY 8 HOURS
Status: DISCONTINUED | OUTPATIENT
Start: 2022-07-26 | End: 2022-07-26 | Stop reason: HOSPADM

## 2022-07-26 RX ORDER — MELATONIN
2000 DAILY
Status: DISCONTINUED | OUTPATIENT
Start: 2022-07-27 | End: 2022-07-26 | Stop reason: HOSPADM

## 2022-07-26 RX ORDER — PROCHLORPERAZINE EDISYLATE 5 MG/ML
5 INJECTION INTRAMUSCULAR; INTRAVENOUS
Status: DISCONTINUED | OUTPATIENT
Start: 2022-07-26 | End: 2022-07-26 | Stop reason: HOSPADM

## 2022-07-26 RX ORDER — OXYCODONE AND ACETAMINOPHEN 5; 325 MG/1; MG/1
1 TABLET ORAL AS NEEDED
Status: DISCONTINUED | OUTPATIENT
Start: 2022-07-26 | End: 2022-07-26 | Stop reason: HOSPADM

## 2022-07-26 RX ORDER — DEXAMETHASONE SODIUM PHOSPHATE 4 MG/ML
INJECTION, SOLUTION INTRA-ARTICULAR; INTRALESIONAL; INTRAMUSCULAR; INTRAVENOUS; SOFT TISSUE AS NEEDED
Status: DISCONTINUED | OUTPATIENT
Start: 2022-07-26 | End: 2022-07-26 | Stop reason: HOSPADM

## 2022-07-26 RX ORDER — GABAPENTIN 100 MG/1
100 CAPSULE ORAL 3 TIMES DAILY
Status: DISCONTINUED | OUTPATIENT
Start: 2022-07-26 | End: 2022-07-26 | Stop reason: HOSPADM

## 2022-07-26 RX ORDER — HYDROMORPHONE HYDROCHLORIDE 2 MG/ML
INJECTION, SOLUTION INTRAMUSCULAR; INTRAVENOUS; SUBCUTANEOUS AS NEEDED
Status: DISCONTINUED | OUTPATIENT
Start: 2022-07-26 | End: 2022-07-26 | Stop reason: HOSPADM

## 2022-07-26 RX ORDER — PREGABALIN 75 MG/1
75 CAPSULE ORAL ONCE
Status: COMPLETED | OUTPATIENT
Start: 2022-07-26 | End: 2022-07-26

## 2022-07-26 RX ORDER — NALOXONE HYDROCHLORIDE 0.4 MG/ML
0.4 INJECTION, SOLUTION INTRAMUSCULAR; INTRAVENOUS; SUBCUTANEOUS AS NEEDED
Status: DISCONTINUED | OUTPATIENT
Start: 2022-07-26 | End: 2022-07-26 | Stop reason: HOSPADM

## 2022-07-26 RX ORDER — SODIUM CHLORIDE, SODIUM LACTATE, POTASSIUM CHLORIDE, CALCIUM CHLORIDE 600; 310; 30; 20 MG/100ML; MG/100ML; MG/100ML; MG/100ML
25 INJECTION, SOLUTION INTRAVENOUS CONTINUOUS
Status: DISCONTINUED | OUTPATIENT
Start: 2022-07-26 | End: 2022-07-26 | Stop reason: HOSPADM

## 2022-07-26 RX ORDER — HYDROXYZINE HYDROCHLORIDE 10 MG/1
10 TABLET, FILM COATED ORAL
Status: DISCONTINUED | OUTPATIENT
Start: 2022-07-26 | End: 2022-07-26 | Stop reason: HOSPADM

## 2022-07-26 RX ORDER — OXYCODONE HYDROCHLORIDE 5 MG/1
5 TABLET ORAL
Status: DISCONTINUED | OUTPATIENT
Start: 2022-07-26 | End: 2022-07-26 | Stop reason: HOSPADM

## 2022-07-26 RX ORDER — SODIUM CHLORIDE 9 MG/ML
125 INJECTION, SOLUTION INTRAVENOUS CONTINUOUS
Status: DISCONTINUED | OUTPATIENT
Start: 2022-07-26 | End: 2022-07-26 | Stop reason: HOSPADM

## 2022-07-26 RX ORDER — ROPIVACAINE HYDROCHLORIDE 5 MG/ML
INJECTION, SOLUTION EPIDURAL; INFILTRATION; PERINEURAL AS NEEDED
Status: DISCONTINUED | OUTPATIENT
Start: 2022-07-26 | End: 2022-07-26 | Stop reason: HOSPADM

## 2022-07-26 RX ORDER — MIDAZOLAM HYDROCHLORIDE 1 MG/ML
INJECTION, SOLUTION INTRAMUSCULAR; INTRAVENOUS AS NEEDED
Status: DISCONTINUED | OUTPATIENT
Start: 2022-07-26 | End: 2022-07-26 | Stop reason: HOSPADM

## 2022-07-26 RX ORDER — DIAZEPAM 5 MG/1
5 TABLET ORAL
Status: DISCONTINUED | OUTPATIENT
Start: 2022-07-26 | End: 2022-07-26 | Stop reason: HOSPADM

## 2022-07-26 RX ADMIN — ROCURONIUM BROMIDE 45 MG: 10 INJECTION INTRAVENOUS at 09:06

## 2022-07-26 RX ADMIN — Medication 3 MG: at 10:04

## 2022-07-26 RX ADMIN — LIDOCAINE HYDROCHLORIDE 80 MG: 20 INJECTION, SOLUTION EPIDURAL; INFILTRATION; INTRACAUDAL; PERINEURAL at 08:57

## 2022-07-26 RX ADMIN — HYDROMORPHONE HYDROCHLORIDE 0.5 MG: 2 INJECTION, SOLUTION INTRAMUSCULAR; INTRAVENOUS; SUBCUTANEOUS at 09:13

## 2022-07-26 RX ADMIN — Medication 3 AMPULE: at 07:13

## 2022-07-26 RX ADMIN — HYDROMORPHONE HYDROCHLORIDE 0.5 MG: 2 INJECTION, SOLUTION INTRAMUSCULAR; INTRAVENOUS; SUBCUTANEOUS at 09:25

## 2022-07-26 RX ADMIN — ROCURONIUM BROMIDE 5 MG: 10 INJECTION INTRAVENOUS at 08:57

## 2022-07-26 RX ADMIN — WATER 2 G: 1 INJECTION INTRAMUSCULAR; INTRAVENOUS; SUBCUTANEOUS at 08:57

## 2022-07-26 RX ADMIN — SODIUM CHLORIDE 125 ML/HR: 9 INJECTION, SOLUTION INTRAVENOUS at 10:40

## 2022-07-26 RX ADMIN — MIDAZOLAM HYDROCHLORIDE 2 MG: 1 INJECTION, SOLUTION INTRAMUSCULAR; INTRAVENOUS at 08:54

## 2022-07-26 RX ADMIN — OXYCODONE 5 MG: 5 TABLET ORAL at 13:58

## 2022-07-26 RX ADMIN — DIAZEPAM 5 MG: 5 TABLET ORAL at 11:33

## 2022-07-26 RX ADMIN — DEXMEDETOMIDINE HYDROCHLORIDE 10 MCG: 100 INJECTION, SOLUTION, CONCENTRATE INTRAVENOUS at 09:03

## 2022-07-26 RX ADMIN — SODIUM CHLORIDE, POTASSIUM CHLORIDE, SODIUM LACTATE AND CALCIUM CHLORIDE 25 ML/HR: 600; 310; 30; 20 INJECTION, SOLUTION INTRAVENOUS at 07:19

## 2022-07-26 RX ADMIN — GLYCOPYRROLATE 0.6 MG: 0.2 INJECTION, SOLUTION INTRAMUSCULAR; INTRAVENOUS at 10:04

## 2022-07-26 RX ADMIN — GABAPENTIN 100 MG: 100 CAPSULE ORAL at 16:27

## 2022-07-26 RX ADMIN — ONDANSETRON HYDROCHLORIDE 4 MG: 2 INJECTION, SOLUTION INTRAMUSCULAR; INTRAVENOUS at 09:52

## 2022-07-26 RX ADMIN — Medication 1000 MG: at 07:12

## 2022-07-26 RX ADMIN — SUCCINYLCHOLINE CHLORIDE 180 MG: 20 INJECTION, SOLUTION INTRAMUSCULAR; INTRAVENOUS at 08:58

## 2022-07-26 RX ADMIN — PREGABALIN 75 MG: 75 CAPSULE ORAL at 07:12

## 2022-07-26 RX ADMIN — FENTANYL CITRATE 25 MCG: 50 INJECTION, SOLUTION INTRAMUSCULAR; INTRAVENOUS at 11:32

## 2022-07-26 RX ADMIN — DEXAMETHASONE SODIUM PHOSPHATE 8 MG: 4 INJECTION, SOLUTION INTRAMUSCULAR; INTRAVENOUS at 09:03

## 2022-07-26 RX ADMIN — PROPOFOL 50 MG: 10 INJECTION, EMULSION INTRAVENOUS at 09:02

## 2022-07-26 RX ADMIN — CEFAZOLIN 2 G: 1 INJECTION, POWDER, FOR SOLUTION INTRAMUSCULAR; INTRAVENOUS at 16:19

## 2022-07-26 RX ADMIN — PROPOFOL 250 MG: 10 INJECTION, EMULSION INTRAVENOUS at 08:57

## 2022-07-26 RX ADMIN — FENTANYL CITRATE 100 MCG: 50 INJECTION, SOLUTION INTRAMUSCULAR; INTRAVENOUS at 08:57

## 2022-07-26 RX ADMIN — ONDANSETRON 4 MG: 2 INJECTION INTRAMUSCULAR; INTRAVENOUS at 10:47

## 2022-07-26 RX ADMIN — PROPOFOL 50 MG: 10 INJECTION, EMULSION INTRAVENOUS at 08:58

## 2022-07-26 NOTE — PROGRESS NOTES
PHYSICAL THERAPY EVALUATION/DISCHARGE  Patient: Navin Jurado (64 y.o. male)  Date: 7/26/2022  Primary Diagnosis: Lumbar pain [M54.50]  Displacement of lumbar intervertebral disc without myelopathy [M51.26]  Lumbar radiculopathy [M54.16]  Sciatica of left side [M54.32]  Status post laminectomy [Z98.890]  Lumbosacral spondylosis without myelopathy [M47.817]  DDD (degenerative disc disease), lumbar [M51.36]  HNP (herniated nucleus pulposus), lumbar [M51.26]  Procedure(s) (LRB):  LEFT L5-S1 DECOMPRESSION  (Left) Day of Surgery   Precautions: spine         ASSESSMENT  Based on the objective data described below, the patient presents with c/o tightness and pain across low back following s/p lumbar decompression today. Patient is able to complete all mobility and ambulation with supervision. Gait is slow but steady and no overt LOB noted. Safe on stairs and able to complete car transfer with supervision. Issued back brace and patient educated on spine precautions. Patient is cleared for discharge from PT standpoint. NP notified      Other factors to consider for discharge: good family support     Further skilled acute physical therapy is not indicated at this time. PLAN :  Recommendation for discharge: (in order for the patient to meet his/her long term goals)  No skilled physical therapy/ follow up rehabilitation needs identified at this time. This discharge recommendation:  Has been made in collaboration with the attending provider and/or case management    IF patient discharges home will need the following DME: none       SUBJECTIVE:   Patient stated My low back is really tight.     OBJECTIVE DATA SUMMARY:   HISTORY:    Past Medical History:   Diagnosis Date    Chronic pain     Lumbar    Nicotine vapor product user      Past Surgical History:   Procedure Laterality Date    HX HEENT      Oral Surgery    HX ORTHOPAEDIC  12/05/2017    right L5-S1 discectomy    HX OTHER SURGICAL      [facility]-Dr. Goldie Amos  L5/S1 11/21 and 01/22       Prior level of function: patient reports independence with overall mobility and ADLs      Home Situation  Home Environment: Private residence  # Steps to Enter: 1 (threshold)  One/Two Story Residence:  (lives in fisnished basement; bath on 1st floor; 3 story)  Lift Chair Available: No (elevator to top floor)  Living Alone: No  Support Systems: Parent(s), Other Family Member(s)  Patient Expects to be Discharged to[de-identified] Home  Current DME Used/Available at Home: None  Tub or Shower Type: Shower    EXAMINATION/PRESENTATION/DECISION MAKING:   Critical Behavior:  Neurologic State: Alert  Orientation Level: Oriented X4  Cognition: Appropriate decision making, Appropriate for age attention/concentration, Appropriate safety awareness, Follows commands  Safety/Judgement: Awareness of environment, Insight into deficits    Range Of Motion:  AROM: Within functional limits                       Strength:    Strength: Within functional limits                    Tone & Sensation:   Tone: Normal              Sensation: Impaired (pt reports numbness in testes; B LEs intact)               Coordination:  Coordination: Within functional limits  Vision:   Acuity: Within Defined Limits  Functional Mobility:  Bed Mobility:  Rolling: Supervision (for log rolling)  Supine to Sit: Supervision     Scooting: Independent (Simultaneous filing.  User may not have seen previous data.)  Transfers:  Sit to Stand: Modified independent  Stand to Sit: Modified independent        Bed to Chair: Independent              Balance:   Sitting: Intact  Standing: Intact  Ambulation/Gait Training:  Distance (ft): 250 Feet (ft)  Assistive Device: Gait belt;Brace/Splint  Ambulation - Level of Assistance: Supervision        Gait Abnormalities:  (no significant gait deviations noted)        Base of Support: Widened (minimally)     Speed/Na: Pace decreased (<100 feet/min)  Step Length: Left shortened;Right shortened (minimally)      Gait is slow but steady with no overt LOB noted           Stairs:  Number of Stairs Trained: 4  Stairs - Level of Assistance: Stand-by assistance   Rail Use: Left     Pain Rating:  Pt reports pain and tightness across low back    Activity Tolerance:   Good      After treatment patient left in no apparent distress:   Sitting EOB with OT    COMMUNICATION/EDUCATION:   The patients plan of care was discussed with: Occupational therapist, Registered nurse, and NP . Fall prevention education was provided and the patient/caregiver indicated understanding., Patient/family have participated as able in goal setting and plan of care. , and Patient/family agree to work toward stated goals and plan of care.     Thank you for this referral.  Carolyn Goodman, PT   Time Calculation: 28 mins

## 2022-07-26 NOTE — ANESTHESIA POSTPROCEDURE EVALUATION
Procedure(s):  LEFT L5-S1 DECOMPRESSION . general    Anesthesia Post Evaluation      Multimodal analgesia: multimodal analgesia used between 6 hours prior to anesthesia start to PACU discharge  Patient location during evaluation: PACU  Patient participation: complete - patient participated  Level of consciousness: awake and alert  Pain management: adequate  Airway patency: patent  Anesthetic complications: no  Cardiovascular status: acceptable  Respiratory status: acceptable  Hydration status: acceptable  Comments: Seen, no complaints   Post anesthesia nausea and vomiting:  none  Final Post Anesthesia Temperature Assessment:  Normothermia (36.0-37.5 degrees C)      INITIAL Post-op Vital signs:   Vitals Value Taken Time   /82 07/26/22 1040   Temp 37 °C (98.6 °F) 07/26/22 1031   Pulse 97 07/26/22 1044   Resp 13 07/26/22 1044   SpO2 100 % 07/26/22 1044   Vitals shown include unvalidated device data.

## 2022-07-26 NOTE — PERIOP NOTES
[facility]brid[facility] Cle[facility]ing [facility]  Ref: Stanford Segal: [id number] LOT: [id number] Expiration Date: 01/31/2024

## 2022-07-26 NOTE — PERIOP NOTES
[address] from Operating Room to PACU    Report received from Yumi Cochran CRNA regarding [employer]. Surgeon(s):  José Miguel Modi MD  And Procedure(s) (LRB):  LEFT L5-S1 DECOMPRESSION  (Left)  confirmed   with allergies and dressings discussed. Anesthesia type, drugs, patient history, complications, estimated blood loss, vital signs, intake and output, and last pain medication, lines, reversal medications, and temperature were reviewed. 1150 Updated pt's mother, Serina, on pt's status & waiting for IP room availability. 1348 TRANSFER - OUT REPORT:    Verbal report given to [place] RN(name) on [employer]  being transferred to Ortho(unit) for routine post - op       Report consisted of patients Situation, Background, Assessment and   Recommendations(SBAR). Information from the following report(s) SBAR, Kardex, OR Summary, Intake/Output, and MAR was reviewed with the receiving nurse. Lines:   Peripheral IV 07/26/22 Left;Posterior; Outer Wrist (Active)   Site Assessment Clean, dry, & intact 07/26/22 1031   Phlebitis Assessment 0 07/26/22 1031   Infiltration Assessment 0 07/26/22 1031   Dressing Status Clean, dry, & intact 07/26/22 1031   Dressing Type Transparent;Tape 07/26/22 1031   Hub Color/Line Status Pink; Infusing 07/26/22 1031        Opportunity for questions and clarification was provided.       Patient transported with:   [employer]
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
## 2022-07-26 NOTE — OP NOTES
[facility]  OPERATIVE REPORT    Name:  Ramila Olson  MR#:  [id number]  :  2000  DATE OF SERVICE:  2022    PREOPERATIVE DIAGNOSES:  1. Lumbar pain    2. Displacement of lumbar intervertebral disc without myelopathy    3. Lumbar radiculopathy    4. Sciatica of left side    5. S/P lumbar laminectomy    6. Lumbosacral spondylosis without myelopathy    7. DDD (degenerative disc disease), lumbar        POSTOPERATIVE DIAGNOSES:  1. Lumbar pain    2. Displacement of lumbar intervertebral disc without myelopathy    3. Lumbar radiculopathy    4. Sciatica of left side    5. S/P lumbar laminectomy    6. Lumbosacral spondylosis without myelopathy    7. DDD (degenerative disc disease), lumbar        PROCEDURES PERFORMED:  1. left L5-S1 microdiscectomy. 2. Use of operative microscope. SURGEON:  Jaylon Rodriguez MD    FIRST ASSISTANT:  VINCENT Gibson  Mr. Afua Martinez has undergone a major surgery. Angel Simmons knowledge about the pertinent anatomy, procedural steps and equipment requirent in this procedure was medically necessary to ensure the safety of the patient. His assistance has helped reduce surgical time by 35%. Surgical tasks included, but are not limited to:  1) Safe and proper retraction. 2) Maintenance of visualization during surgery by helping with tasks such as suctioning. 3) Multiple tasks throughout the decompression and instrumentation to allow for timely and safe completion of the procedure. 4) Overall assistance helped decrease the risk complications such as infection, bleeding, hardware malposition, and damage to surrounding structures. ANESTHESIA:  GETA. COMPLICATIONS:  None. SPECIMENS:  None. IMPLANTS:  None    ESTIMATED BLOOD LOSS:  50 mL. DRAINS:  None. PRE-OP ANTIBIOTICS: Ancef. INDICATIONS FOR PROCEDURE:    Ramila Olson is a 24 y.o. male who has the above listed diagnosis.   Mr. Afua Martinez has failed to improve with non-operative treatment and has chosen to proceed with surgical intervention. We had a long conversation about pros and cons of surgery, risks, possible complications, alternative treatments, and Leopoldo Mutters had an opportunity to ask questions and is satisfied with my answers and explanations. I have personally given warnings about possible complications including but not limited to death, permanent disability, heart attack, stroke, lung injury or infection, blindness, ileus, bladder or bowel problems, ureter injury, bleeding, blood vessel injury, nerve injury (including numbness, pain and weakness), paralysis (which may be permanent), failure to heal, failure to relief symptoms, failure to relief pain, increased pain, need for further surgeries, need to fuse or operate on additional levels determined either during or after surgery, destabilization of the spine (which may require fusion or later surgery), infections (which may or may not require additional surgery), dural tears (tears of the sac holding in nerves and spinal fluid), meningitis, blood clots, pulmonary embolus, recurrent disc herniation, and anesthetic complications. Comorbidities such as obesity, smoking, rheumatoid arthritis, chronic steroid use and diabetes increase these risks. .  Mr. Dov Stone  understands and wants to proceed. NARRATIVE OF THE PROCEDURE:    After informed consent was obtained and Leopoldo Mutters had a chance to ask any last minute questions, the patient was transported to the operating room and underwent general endotracheal anesthesia. Leopoldo Mutters was positioned prone on the Haresh table and the body properly padded. The arms were positioned on the 90/90 position and the knees were gently bend with pillows. Fluoroscopy was used to susan the level of the incision and the site was prepped and draped in the usual manner.   A time out was obtained and we verified that we had the correct patient the correct site and that the proper IV antibiotics had been administered in accordance with SCIP protocol. I then proceeded to perform a standard posterior approach the lumbar spine exposed area between L5-S1 on the left. Once the area was exposed and hemostasis obtained, fluoroscopy was used to verify that we were in the correct level. We then proceeded to bring in the operative microscope and kept it in placed for the remainder of the procedure. I proceeded to use a high-speed drill to perform a U cut laminotomy at L5 and a J cut at S1. I then used a curved curette to detach ligamentum flavum from the superior leading edge. The ligamentum was flipped into the bony defect and removed with a Kerrison 3, followed by Kerrison 4. The thecal sac as well as the transversing nerve root were both exposed. The thecal sac was gently retracted medially and a 15 blade to perform a linear annulotomy. A micropituitary was used to remove the disc fragment. Further fragments were removed with micropituitary and normal saline disc irrigation. Once that area was fully decompressed, I inspected epidural space with a Dey ball. Once satisfied with the decompression, I irrigated the wound with normal saline. Hemostasis was obtained. The fascia was closed with #1 vicryl figure of 8 sutures, the subcutaneous tissue was irrigated and closed with 2-O vicryl, the skin was closed with a 3-0 Monocryl. Sterile dressing was applied the patient was then awakened and transferred to PACU in stable condition. POSTOPERATIVE PLAN:  The patient will have SCDs for DVT prophylaxis and IV antibiotics for infection prophylaxis. COUNTS: Sponge and needle counts were correct.     SIGNED BY:  Jaylon Rodriguez MD     July 26, 2022

## 2022-07-26 NOTE — ANESTHESIA PREPROCEDURE EVALUATION
Anesthetic History   No history of anesthetic complications            Review of Systems / Medical History  Patient summary reviewed, nursing notes reviewed and pertinent labs reviewed    Pulmonary          Smoker      Comments: Nicotine vapor product user (Z72.0)  Marijuana    Neuro/Psych             Comments: Chronic pain (G89.29)   HNP (herniated nucleus pulposus)  Marijuana   LE paresthesias  Cardiovascular  Within defined limits                Exercise tolerance: >4 METS     GI/Hepatic/Renal  Within defined limits              Endo/Other        Obesity     Other Findings              Physical Exam    Airway  Mallampati: II  TM Distance: 4 - 6 cm  Neck ROM: normal range of motion   Mouth opening: Normal     Cardiovascular  Regular rate and rhythm,  S1 and S2 normal,  no murmur, click, rub, or gallop  Rhythm: regular  Rate: normal         Dental    Dentition: Upper dentition intact and Lower dentition intact     Pulmonary  Breath sounds clear to auscultation               Abdominal  GI exam deferred       Other Findings            Anesthetic Plan    ASA: 2  Anesthesia type: general          Induction: Intravenous  Anesthetic plan and risks discussed with: Patient

## 2022-07-26 NOTE — PROGRESS NOTES
Patient discharged home in stable condition by wheelchair with his family. Discharged instruction given.

## 2022-07-26 NOTE — PROGRESS NOTES
End of Shift Note    Bedside shift change report given to Nael Robert (oncoming nurse) by Anselmo Jose RN (offgoing nurse). Report included the following information SBAR, Kardex, Intake/Output, MAR, and Recent Results    Shift worked: day     Shift summary and any significant changes:         Concerns for physician to address:      Zone phone for oncoming shift:  [phone]       Activity:  Activity Level: Bed Rest, Logroll, Up with Assistance  Number times ambulated in hallways past shift: 0  Number of times OOB to chair past shift: 0    Cardiac:   Cardiac Monitoring: No      Cardiac Rhythm: Sinus Rhythm    Access:   Current line(s): PIV     Genitourinary:   Urinary status: due to void    Respiratory:   O2 Device: None (Room air)  Chronic home O2 use?: YES  Incentive spirometer at bedside: YES       GI:  Last Bowel Movement Date: 07/25/22  Current diet:  ADULT DIET Regular  Passing flatus: YES  Tolerating current diet:yes       Pain Management:   Patient states pain is manageable on current regimen: YES    Skin:  John Score: 23  Interventions: turn team, speciality bed, float heels, increase time out of bed, and PT/OT consult    Patient Safety:  Fall Score:  Total Score: 0  Interventions: bed/chair alarm, assistive device (walker, cane, etc), gripper socks, pt to call before getting OOB, and stay with me (per policy)       Length of Stay:  Expected LOS: - - -  Actual LOS: 0      Jessica Bains RN

## 2022-07-26 NOTE — DISCHARGE INSTRUCTIONS
April LUCERO [facility]    Discharge Instruction Sheet :   Lumbar Decompression      Pain control:  Typically, we will prescribe a narcotic usually 1-2 tabs every four hours is sufficient for the pain. Most patients need this only for the first few weeks. You should discontinue this as the pain decreases. You should not drive while taking any narcotic pain medications. Constipation:  Pain medicines and anesthesia can be constipating-this can be prevented by gentle physical activity and drinking plenty of fluid. It should be treated with over-the-counter medications such as Miralax or suppositories, and/or Fleets enema. You should have a bowel movement at least every other day following surgery. Incision care:  Keep this area clean and dry. Leave the current dressing in place for 7 days. You may shower with this dressing, but DO NOT take a tub bath or go swimming until cleared by your doctor. DO NOT apply lotions, oils, or creams to incision. After 7 days, remove this dressing and apply a new opsite (impermiable)  Dressing over the incision. This dressing may stay on for 7 more days (until your follow up visit with your surgeon). If staples are in place, they should be removed about 14-20 days after surgery. To increase and promote healing:  Stop Smoking (or at least cut back on smoking). Eat a well-balanced diet (high in protein and vitamin C)  If your appetite is poor, consider nutritional supplements like Ensure, Glucerna, or [place] Instant Breakfast.  If you are diabetic, controlling you blood sugars is very important to prevent infection and promote wound healing. Nutrition:  If you were on a supplement such as Ensure or Glucerna) while in the hospital, please continue using them with each meal for the next 30 days.   Eat a well-balanced diet - High in protein, high in vitamins and minerals, especially vitamin C and zinc.     Restrictions:  Limit bending and twisting at waist  Wear brace as instructed  Lift no more than 10 pounds    Warning signs: Please call your physician immediately at [phone] if you have  Bleeding from incision that is constant. Change in mental status (unusual behavior or confusion)  If your incision develops redness or swelling  Change in wound drainage (increase in amount, color, or foul odor)  [place] over 101.5 degrees Fahrenheit   Headache that is not relieved with pain medication  Tenderness or redness in the calf of your leg    Emergency: CALL 911 if you have  Shortness of breath  Chest pain  Localized chest pain when coughing or taking a deep breath    Follow-up:  Please call Dr. Nora Jackson office for a follow up appointment in 2-3 weeks at [phone]. You can return to work when cleared by a physician. During normal business hours you may reach Dr. Keiry Elliott' team directly at [phone] if you have concerns or questions.       Fayrene Hence

## 2022-07-26 NOTE — PERIOP NOTES
06:52= has had 3 Moderna vaccinations, has not been exposed or had any s/s virus. Warming blanket applied. No mepilex applied due to procedure position.

## 2022-07-26 NOTE — DISCHARGE SUMMARY
Spine Discharge Summary    Patient ID:  Sheryle Rua  [id number]  male  24 y.o.  2000    Admit date: 7/26/2022    Discharge date: 7/26/2022    Admitting Physician: Karen [place] MD     Consulting Physician(s):   Treatment Team: Attending Provider: Kb Rizo MD; Utilization Review: Fanny Sorto RN; Care Manager: Michael Truong    Date of Surgery:   7/26/2022     Preoperative Diagnosis:  Lumbar pain [M54.50]  Displacement of lumbar intervertebral disc without myelopathy [M51.26]  Lumbar radiculopathy [M54.16]  Sciatica of left side [M54.32]  Status post laminectomy [Z98.890]  Lumbosacral spondylosis without myelopathy [M47.817]  DDD (degenerative disc disease), lumbar [M51.36]    Postoperative Diagnosis:   umbar pain [M54.50]  Displacement of lumbar intervertebral disc without myelopathy [M51.26]  Lumbar radiculopathy [M54.16]  Sciatica of left side [M54.32]  Status post laminectomy [Z98.890]  Lumbosacral spondylosis without myelopathy [M47.817]  DDD (degenerative disc disease), lumbar [M51.36]    Procedure(s):  LEFT L5-S1 DECOMPRESSION      Anesthesia Type: Other     Surgeon: bK Rizo MD                            HPI:  Pt is a 24 y.o. male who has a history of Lumbar pain [M54.50]  Displacement of lumbar intervertebral disc without myelopathy [M51.26]  Lumbar radiculopathy [M54.16]  Sciatica of left side [M54.32]  Status post laminectomy [Z98.890]  Lumbosacral spondylosis without myelopathy [M47.817]  DDD (degenerative disc disease), lumbar [M51.36]  with pain and limitations of activities of daily living who presents at this time for a LEFT L5-S1 DECOMPRESSION   following the failure of conservative management. PMH:   Past Medical History:   Diagnosis Date    Chronic pain     Lumbar    Nicotine vapor product user        Body mass index is 33.59 kg/m². : A BMI > 30 is classified as obesity and > 40 is classified as morbid obesity.      Medications upon admission :   Prior to Admission Medications   Prescriptions Last Dose Informant Patient Reported? Taking?   ergocalciferol, vitamin D2, (VITAMIN D2 PO) 7/26/2022 at 0600  Yes Yes   Sig: Take 2,000 Int'l Units/hr by mouth daily. gabapentin (NEURONTIN) 300 mg capsule Not Taking  Yes No   Sig: Take 300 mg by mouth two (2) times a day. Patient not taking: Reported on 7/26/2022      Facility-Administered Medications: None        Allergies:  No Known Allergies     Hospital Course: The patient underwent surgery. Complications:  None; patient tolerated the procedure well. Was taken to the PACU in stable condition and then transferred to the ortho floor. Perioperative Antibiotics:  Ancef     Postoperative Pain Management:  Oxycodone, Gabapentin & Tylenol     Postoperative transfusions:    Number of units banked PRBCs =   none     Post Op complications: none    Hemoglobin at discharge:    Lab Results   Component Value Date/Time    HGB 14.3 07/21/2022 03:07 PM    INR 1.0 07/21/2022 03:07 PM       Dressing remained clean, dry and intact. No significant erythema or swelling. Wound appears to be healing without any evidence of infection. Neurovascular exam found to be within normal limits. Physical Therapy started following surgery and participated in bed mobility, transfers and ambulation. Discharged to: Home. Condition on Discharge:   stable    Discharge instructions:  - Take pain medications as prescribed  - Resume pre hospital diet      - Discharge activity: activity as tolerated  - Ambulate as tolerated  - Lumbar brace when oob  - Avoid bending, lifting and twisting  - Wound Care Keep wound clean and dry. See discharge instruction sheet. -DISCHARGE MEDICATION LIST     Current Discharge Medication List        START taking these medications    Details   acetaminophen (TYLENOL) 500 mg tablet Take 2 Tablets by mouth every six (6) hours for 7 days.   Qty: 64 Tablet, Refills: 0  Start date: 7/26/2022, End date: 8/2/2022      oxyCODONE IR (ROXICODONE) 5 mg immediate release tablet Take 1 Tablet by mouth every six (6) hours as needed for Pain for up to 7 days. Max Daily Amount: 20 mg.  Qty: 28 Tablet, Refills: 0  Start date: 7/26/2022, End date: 8/2/2022    Associated Diagnoses: Status post lumbar spine operative procedure for decompression of spinal cord      senna-docusate (PERICOLACE) 8.6-50 mg per tablet Take 1 Tablet by mouth two (2) times a day for 7 days. Qty: 14 Tablet, Refills: 0  Start date: 7/26/2022, End date: 8/2/2022           CONTINUE these medications which have CHANGED    Details   gabapentin (NEURONTIN) 100 mg capsule Take 1 Capsule by mouth three (3) times daily for 30 days. Max Daily Amount: 300 mg. Qty: 90 Capsule, Refills: 0  Start date: 7/26/2022, End date: 8/25/2022    Associated Diagnoses: Status post lumbar spine operative procedure for decompression of spinal cord; HNP (herniated nucleus pulposus), lumbar           CONTINUE these medications which have NOT CHANGED    Details   ergocalciferol, vitamin D2, (VITAMIN D2 PO) Take 2,000 Int'l Units/hr by mouth daily.           per medical continuation form      -Follow up in office in 2 weeks      Signed:  Kunal Wick NP  Orthopaedic Nurse Practitioner    7/26/2022  3:55 PM

## 2022-07-26 NOTE — PROGRESS NOTES
OCCUPATIONAL THERAPY EVALUATION/DISCHARGE  Patient: Latrice Kimble (17 y.o. male)  Date: 7/26/2022  Primary Diagnosis: Lumbar pain [M54.50]  Displacement of lumbar intervertebral disc without myelopathy [M51.26]  Lumbar radiculopathy [M54.16]  Sciatica of left side [M54.32]  Status post laminectomy [Z98.890]  Lumbosacral spondylosis without myelopathy [M47.817]  DDD (degenerative disc disease), lumbar [M51.36]  HNP (herniated nucleus pulposus), lumbar [M51.26]  Procedure(s) (LRB):  LEFT L5-S1 DECOMPRESSION  (Left) Day of Surgery   Precautions: Spine       ASSESSMENT  Based on the objective data described below, the patient presents with the expected post op spine precautions and lumbar surgical site pain which is impacting his functional performance. Upon completion of all post op spine education and training, related to the performance of ADLs and functional mobility the patient was able to demonstrate the ability to perform his ADLs and functional mobility at an independent to mod I level. He was very compliant t/o the evaluation and demonstrated consistent adherence to his spine precautions during all functional activity. At this time he is without further OT needs, acutely and after discharge. Functional Outcome Measure: The patient scored 100/100 on the Barthel Index outcome measure which is indicative of a 0% decline in function. PLAN :  Recommendation for discharge: (in order for the patient to meet his/her long term goals)  No skilled occupational therapy/ follow up rehabilitation needs identified at this time.     Equipment recommendations for successful discharge: None       OBJECTIVE DATA SUMMARY:   HISTORY:   Past Medical History:   Diagnosis Date    Chronic pain     Lumbar    Nicotine vapor product user      Past Surgical History:   Procedure Laterality Date    HX HEENT      Oral Surgery    HX ORTHOPAEDIC  12/05/2017    right L5-S1 discectomy    HX OTHER SURGICAL      [facility]-Dr. Rowan Huitron  L5/S1 11/21 and 01/22       Prior Level of Function/Environment/Context: Fully independent and active  Expanded or extensive additional review of patient history:   Home Situation  Home Environment: Private residence  # Steps to Enter: 1 (threshold)  One/Two Story Residence:  (lives in fisnished basement; bath on 1st floor; 3 story)  Lift Chair Available: No (elevator to top floor)  Living Alone: No  Support Systems: Parent(s), Other Family Member(s)  Patient Expects to be Discharged to[de-identified] Home  Current DME Used/Available at Home: None  Tub or Shower Type: Shower    Hand dominance: Right    EXAMINATION OF PERFORMANCE DEFICITS:  Cognitive/Behavioral Status:  Neurologic State: Alert  Orientation Level: Oriented X4  Cognition: Appropriate decision making; Appropriate for age attention/concentration; Appropriate safety awareness; Follows commands        Safety/Judgement: Awareness of environment; Insight into deficits        Vision/Perceptual:    Acuity: Within Defined Limits         Range of Motion:  AROM: Within functional limits    Strength:  Strength: Within functional limits  Coordination:  Coordination: Within functional limits            Tone & Sensation:  Tone: Normal  Sensation: Impaired (pt reports numbness in testes; B LEs intact)    Balance:  Sitting: Intact  Standing: Intact    Functional Mobility and Transfers for ADLs:  Bed Mobility:  Presented seated up working with PT.   Scooting: Independent    Transfers:  Sit to Stand: Modified independent  Stand to Sit: Modified independent  Bed to Chair: Independent  Bathroom Mobility: Independent  Toilet Transfer : Modified independent    ADL Assessment:  Feeding: Independent    Oral Facial Hygiene/Grooming: Independent    Bathing: Modified independent    Upper Body Dressing: Independent    Lower Body Dressing: Modified independent    Toileting: Independent                ADL Intervention and task modifications:       Grooming  Position Performed: Standing  Washing Hands: Independent    Upper Body Dressing Assistance  Dressing Assistance: Independent  Pullover Shirt: Independent    Lower Body Dressing Assistance  Underpants: Modified independent; Compensatory technique training  Pants With Elastic Waist: Modified independent; Compensatory technique training  Socks: Modified independent; Compensatory technique training  Slip on Shoes Without Back: Independent  Leg Crossed Method Used: Yes  Position Performed: Seated edge of bed;Standing  Cues: Verbal cues provided    Cognitive Retraining  Safety/Judgement: Awareness of environment; Insight into deficits    Reviewed post op spine precautions as they relate to bed mobility and ADL performance. Instructed patient to avoid reaching across the midline of his body to prevent trunk rotation during bathing, dressing and the performance of item retrieval during ADLs and meal prep. Patient instructed in log rolling technique for bed mobility and use of leg crossed technique for LB dressing to prevent trunk rotation and flexion. Instructed patient and family to have needed items placed at a level between shoulder and knee height , so they can be easily accessed to promote proper body mechanics and to minimize risk for LOB. Patient able to verbalize and/or demonstrate full understanding upon completion of this education/training. Functional Measure:  Barthel Index:    Bathin  Bladder: 10  Bowels: 10  Groomin  Dressing: 10  Feeding: 10  Mobility: 15  Stairs: 10  Toilet Use: 10  Transfer (Bed to Chair and Back): 15  Total: 100/100        The Barthel ADL Index: Guidelines  1. The index should be used as a record of what a patient does, not as a record of what a patient could do. 2. The main aim is to establish degree of independence from any help, physical or verbal, however minor and for whatever reason. 3. The need for supervision renders the patient not independent.   4. A patient's performance should be established using the best available evidence. Asking the patient, friends/relatives and nurses are the usual sources, but direct observation and common sense are also important. However direct testing is not needed. 5. Usually the patient's performance over the preceding 24-48 hours is important, but occasionally longer periods will be relevant. 6. Middle categories imply that the patient supplies over 50 per cent of the effort. 7. Use of aids to be independent is allowed. Yris Vallecillo., Barthel, DBRIAN. (2062). Functional evaluation: the Barthel Index. [address] [facility] (14)2. JASIEL Noel, Vinita Berkowitz, Rob Sun., [facility], [address] [facility] (1999). Measuring the change indisability after inpatient rehabilitation; comparison of the responsiveness of the Barthel Index and Functional [place] Measure. Journal of Neurology, Neurosurgery, and Psychiatry, 66(4), 998-847. MIREYA Duff.A, JUAN DAVID Peña, & Serena Soto M.A. (2004.) Assessment of post-stroke quality of life in cost-effectiveness studies: The usefulness of the Barthel Index and the EuroQoL-5D. Quality of Life Research, 13, 427-43      Pain Rating:  Intermittent lumbar surgical site pain. Isolated, not radiating. Activity Tolerance:   Good  VSS    After treatment patient left in no apparent distress:    Sitting in chair, Call bell within reach, and Caregiver / family present    COMMUNICATION/EDUCATION:   The patients plan of care was discussed with: Physical Therapist, Registered Nurse, and NP .     Thank you for this referral.  Oren Hall, OTR/L  Time Calculation: 31 mins

## 2022-07-26 NOTE — PROGRESS NOTES
CM following planned spinal surgery patient to assist with any potential d/c needs.     Guillermina Soria, [facility], [address]

## 2022-07-26 NOTE — BRIEF OP NOTE
Brief Postoperative Note    Patient: Miguel Gaona  YOB: 2000  MRN: [id number]    Date of Procedure: 7/26/2022     Pre-Op Diagnosis: Lumbar pain [M54.50]  Displacement of lumbar intervertebral disc without myelopathy [M51.26]  Lumbar radiculopathy [M54.16]  Sciatica of left side [M54.32]  Status post laminectomy [Z98.890]  Lumbosacral spondylosis without myelopathy [M47.817]  DDD (degenerative disc disease), lumbar [M51.36]    Post-Op Diagnosis: Same as preoperative diagnosis.       Procedure(s):  LEFT L5-S1 DECOMPRESSION     Surgeon(s):  Kim Gardnre MD    Surgical Assistant: Physician Assistant: Cherylynn Moritz, PA  Surg Asst-1: Al Cabrales    Anesthesia: Other     Estimated Blood Loss (mL): less than 50     Complications: None    Specimens: * No specimens in log *     Implants: * No implants in log *    Drains: * No LDAs found *    Findings: hnp    Electronically Signed by Misael Shrestha MD on 7/26/2022 at 9:54 AM

## 2022-07-26 NOTE — PROGRESS NOTES
Ortho / Neurosurgery NP Note    POD# 0  s/p LEFT L5-S1 DECOMPRESSION    Pt seen with family at bedside. Pt resting in bed. Awake and alert, in NAD. Reports appropriate incisional pain, well controlled with oxycodone   States LLE numbness and tingling significantly improved. Denies leg pain. Feels testicular numbness is starting to improve   States gabapentin has worked very well in past but has been off for several months   Reports nausea earlier in recovery room, but has since resolved. Tolerating regular diet. No nausea. He would like to return home today if able. VSS Afebrile. Room air. Visit Vitals  /80   Pulse 88   Temp 98 °F (36.7 °C)   Resp 18   Ht 5' 6\" (1.676 m)   Wt 94.4 kg (208 lb 1.8 oz)   SpO2 97%   BMI 33.59 kg/m²       Voiding status: due to void   Output (mL)  Last Bowel Movement Date: 07/25/22 (07/26/22 1420)  Unmeasurable Output  Urine Occurrence(s): 1 (ambulated to bathroom, voided x 1) (07/26/22 0804)      Labs    Lab Results   Component Value Date/Time    HGB 14.3 07/21/2022 03:07 PM      Lab Results   Component Value Date/Time    INR 1.0 07/21/2022 03:07 PM      Lab Results   Component Value Date/Time    Sodium 139 07/21/2022 03:07 PM    Potassium 3.8 07/21/2022 03:07 PM    Chloride 108 07/21/2022 03:07 PM    CO2 24 07/21/2022 03:07 PM    Glucose 93 07/21/2022 03:07 PM    BUN 12 07/21/2022 03:07 PM    Creatinine 0.81 07/21/2022 03:07 PM    Calcium 9.0 07/21/2022 03:07 PM     Recent Glucose Results: No results found for: GLU, GLUPOC, GLUCPOC        Body mass index is 33.59 kg/m². : A BMI > 30 is classified as obesity and > 40 is classified as morbid obesity. Prineo dressing c.d.i  Calves soft and supple; No pain with passive stretch  Sensation and motor intact.  BLE 5/5  SCDs for mechanical DVT proph while in bed     PLAN:  1) Neurovascular assessment q4 hours   2) PT/OT - LSO   3) Pain control - scheduled tylenol and gabapentin, prn oxycodone   4) Readniess for discharge:     [x] Vital Signs stable    [] Hgb stable    [] + Voiding    [x] Wound intact, drainage minimal    [x] Tolerating PO intake     [] Cleared by PT (OT if applicable)     [] Stair training completed (if applicable)    [] Independent / Contact Guard Assist (household distance)     [] Bed mobility     [] Car transfers     [] ADLs    [x] Adequate pain control on oral medication alone     Discharge home later today if able to void and clear therapy.      Isamar Garcia NP

## 2022-09-15 ENCOUNTER — OFFICE VISIT (OUTPATIENT)
Dept: PRIMARY CARE CLINIC | Age: 22
End: 2022-09-15
Payer: COMMERCIAL

## 2022-09-15 VITALS
WEIGHT: 219.8 LBS | HEIGHT: 66 IN | DIASTOLIC BLOOD PRESSURE: 65 MMHG | RESPIRATION RATE: 16 BRPM | OXYGEN SATURATION: 99 % | TEMPERATURE: 98.2 F | SYSTOLIC BLOOD PRESSURE: 106 MMHG | HEART RATE: 65 BPM | BODY MASS INDEX: 35.32 KG/M2

## 2022-09-15 DIAGNOSIS — Z00.00 VISIT FOR WELL MAN HEALTH CHECK: Primary | ICD-10-CM

## 2022-09-15 DIAGNOSIS — Z98.890 S/P DISCECTOMY: ICD-10-CM

## 2022-09-15 PROCEDURE — G8510 SCR DEP NEG, NO PLAN REQD: HCPCS | Performed by: FAMILY MEDICINE

## 2022-09-15 PROCEDURE — G8419 CALC BMI OUT NRM PARAM NOF/U: HCPCS | Performed by: FAMILY MEDICINE

## 2022-09-15 PROCEDURE — 99385 PREV VISIT NEW AGE 18-39: CPT | Performed by: FAMILY MEDICINE

## 2022-09-15 RX ORDER — MULTIVIT WITH MINERALS/HERBS
1 TABLET ORAL DAILY
COMMUNITY
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
## 2022-09-15 NOTE — PROGRESS NOTES
Identified pt with two pt identifiers(name and ). Chief Complaint   Patient presents with    [facility]        3 most recent [facility] 36 Screens 9/15/2022   Little interest or pleasure in doing things Not at all   Feeling down, depressed, irritable, or hopeless Not at all   Total Score PHQ 2 0   Trouble falling or staying asleep, or sleeping too much Not at all   Feeling tired or having little energy Several days   Poor appetite, weight loss, or overeating Not at all   Feeling bad about yourself - or that you are a failure or have let yourself or your family down Not at all   Trouble concentrating on things such as school, work, reading, or watching TV Not at all   Moving or speaking so slowly that other people could have noticed; or the opposite being so fidgety that others notice Not at all   Thoughts of being better off dead, or hurting yourself in some way Not at all   PHQ 9 Score 1        Vitals:    09/15/22 1138   BP: 106/65   Pulse: 65   Resp: 16   Temp: 98.2 °F (36.8 °C)   TempSrc: Temporal   SpO2: 99%   Weight: 219 lb 12.8 oz (99.7 kg)   Height: 5' 6\" (1.676 m)       Health Maintenance Due   Topic Date Due    Hepatitis C Screening  Never done    Medicare Yearly Exam  Never done    Flu Vaccine (1) 2022        1. Have you been to the ER, urgent care clinic since your last visit? Hospitalized since your last visit? No    2. Have you seen or consulted any other health care providers outside of the [address] [place] [address] since your last visit? Include any pap smears or colon screening. No    3. For patients aged 39-70: Has the patient had a colonoscopy / FIT/ Cologuard? NA - based on age      If the patient is female:    4. For patients aged 41-77: Has the patient had a mammogram within the past 2 years? NA - based on age or sex      11. For patients aged 21-65: Has the patient had a pap smear?  NA - based on age or sex

## (undated) DEVICE — KIT JACK TBL PT CARE

## (undated) DEVICE — TUBING IRRIG L77IN DIA0.241IN L BOR FOR CYSTO W/ NVENT

## (undated) DEVICE — STERILE POLYISOPRENE POWDER-FREE SURGICAL GLOVES WITH EMOLLIENT COATING: Brand: PROTEXIS

## (undated) DEVICE — NDL SPNE QNCKE 18GX3.5IN LF --

## (undated) DEVICE — FLOSEAL MATRIX IS INDICATED IN SURGICAL PROCEDURES (OTHER THAN IN OPHTHALMIC) AS AN ADJUNCT TO HEMOSTASIS WHEN CONTROL OF BLEEDING BY LIGATURE OR CONVENTIONALPROCEDURES IS INEFFECTIVE OR IMPRACTICAL.: Brand: FLOSEAL HEMOSTATIC MATRIX

## (undated) DEVICE — SNAP KOVER: Brand: UNBRANDED

## (undated) DEVICE — SOLUTION IRRIG 1000ML H2O STRL BLT

## (undated) DEVICE — SOLUTION IRRIG 3000ML 0.9% SOD CHL FLX CONT 0797208] ICU MEDICAL INC]

## (undated) DEVICE — COVER,MAYO STAND,STERILE: Brand: MEDLINE

## (undated) DEVICE — STERILE POLYISOPRENE POWDER-FREE SURGICAL GLOVES: Brand: PROTEXIS

## (undated) DEVICE — INFECTION CONTROL KIT SYS

## (undated) DEVICE — LAMINECTOMY-MRMC: Brand: MEDLINE INDUSTRIES, INC.

## (undated) DEVICE — 3.0MM PRECISION NEURO (MATCH HEAD)

## (undated) DEVICE — SUTURE V-LOC 180 SZ 0 L12IN ABSRB GRN L37MM GS-21 1/2 CIR VLOCL0316

## (undated) DEVICE — MARKER,SKIN,WI/RULER AND LABELS: Brand: MEDLINE

## (undated) DEVICE — GLOVE SURG SZ 75 L12IN FNGR THK79MIL GRN LTX FREE

## (undated) DEVICE — DRAPE MICSCP W20XL64IN CLR LENS WECK FOR ZEISS OPMI

## (undated) DEVICE — LAMINECTOMY RICHMOND-LF: Brand: MEDLINE INDUSTRIES, INC.

## (undated) DEVICE — SUTURE VCRL SZ 2-0 L18IN ABSRB UD CT-1 L36MM 1/2 CIR J839D

## (undated) DEVICE — GLOVE ORTHO 7 1/2   MSG9475

## (undated) DEVICE — SUTURE STRATAFIX SYMMETRIC SZ 1 L18IN ABSRB VLT CT1 L36CM SXPP1A404

## (undated) DEVICE — 3M™ BAIR HUGGER® UNDERBODY BLANKET, SPINAL, 10 PER CASE 57501: Brand: BAIR HUGGER™

## (undated) DEVICE — MEDI-VAC NON-CONDUCTIVE SUCTION TUBING: Brand: CARDINAL HEALTH

## (undated) DEVICE — DRAPE,LAPAROTOMY,PCH,STERILE: Brand: MEDLINE

## (undated) DEVICE — (D)PREP SKN CHLRAPRP APPL 26ML -- CONVERT TO ITEM 371833

## (undated) DEVICE — PREP SKN CHLRAPRP APL 26ML STR --

## (undated) DEVICE — SOLUTION IV 1000ML 0.9% SOD CHL

## (undated) DEVICE — SPONGE GZ W4XL4IN COT 12 PLY TYP VII WVN C FLD DSGN

## (undated) DEVICE — SOLUTION IRRIG 1000ML 0.9% SOD CHL USP POUR PLAS BTL

## (undated) DEVICE — ELECTRODE BLDE L4IN NONINSULATED EDGE

## (undated) DEVICE — Z CONVERTED USE 2107985 COVER FLROSCP W36XL28IN 4 SIDE ADH

## (undated) DEVICE — NEEDLE HYPO 18GA L1.5IN PNK S STL HUB POLYPR SHLD REG BVL

## (undated) DEVICE — Device

## (undated) DEVICE — SKIN MARKER,REGULAR TIP WITH RULER AND LABELS: Brand: DEVON

## (undated) DEVICE — BLADE ES L4IN INSUL EDGE

## (undated) DEVICE — BIPOLAR FORCEPS CORD,BANANA LEADS: Brand: VALLEYLAB

## (undated) DEVICE — HANDLE LT SNAP ON ULT DURABLE LENS FOR TRUMPF ALC DISPOSABLE

## (undated) DEVICE — SYR 50ML LR LCK 1ML GRAD NSAF --

## (undated) DEVICE — TOOL 14MH30 LEGEND 14CM 3MM: Brand: MIDAS REX ™

## (undated) DEVICE — BONE WAX WHITE: Brand: BONE WAX WHITE

## (undated) DEVICE — SUTURE STRATAFIX SPRL MCRYL + SZ 2-0 L18IN ABSRB UD CT-1 SXMP1B413

## (undated) DEVICE — SUTURE ABSORBABLE MONOFILAMENT 0 CT-1 36 MM DYED SPRL PDS + SXPP1B450

## (undated) DEVICE — GAUZE SPONGES,12 PLY: Brand: CURITY

## (undated) DEVICE — SLIM BODY SKIN STAPLER: Brand: APPOSE ULC

## (undated) DEVICE — KENDALL SCD EXPRESS SLEEVES, KNEE LENGTH, MEDIUM: Brand: KENDALL SCD

## (undated) DEVICE — 450 ML BOTTLE OF 0.05% CHLORHEXIDINE GLUCONATE IN 99.95% STERILE WATER FOR IRRIGATION, USP AND APPLICATOR.: Brand: IRRISEPT ANTIMICROBIAL WOUND LAVAGE

## (undated) DEVICE — TRANSFER SET 3": Brand: MEDLINE INDUSTRIES, INC.

## (undated) DEVICE — SUTURE VCRL SZ 1 L18IN ABSRB VLT CT-1 L36MM 1/2 CIR J741D

## (undated) DEVICE — TOWEL SURG W17XL27IN STD BLU COT NONFENESTRATED PREWASHED

## (undated) DEVICE — GOWN,SIRUS,NONRNF,SETINSLV,2XL,18/CS: Brand: MEDLINE

## (undated) DEVICE — GLOVE SURG SZ 85 L12IN FNGR THK79MIL GRN LTX FREE